# Patient Record
Sex: FEMALE | Race: WHITE | NOT HISPANIC OR LATINO | Employment: FULL TIME | URBAN - NONMETROPOLITAN AREA
[De-identification: names, ages, dates, MRNs, and addresses within clinical notes are randomized per-mention and may not be internally consistent; named-entity substitution may affect disease eponyms.]

---

## 2022-01-10 ENCOUNTER — OFFICE VISIT (OUTPATIENT)
Dept: URGENT CARE | Facility: CLINIC | Age: 62
End: 2022-01-10
Payer: COMMERCIAL

## 2022-01-10 VITALS
HEIGHT: 65 IN | BODY MASS INDEX: 38.49 KG/M2 | TEMPERATURE: 97.5 F | WEIGHT: 231 LBS | SYSTOLIC BLOOD PRESSURE: 180 MMHG | DIASTOLIC BLOOD PRESSURE: 88 MMHG | OXYGEN SATURATION: 98 % | HEART RATE: 71 BPM

## 2022-01-10 DIAGNOSIS — J06.9 ACUTE URI: Primary | ICD-10-CM

## 2022-01-10 PROCEDURE — G0382 LEV 3 HOSP TYPE B ED VISIT: HCPCS

## 2022-01-10 PROCEDURE — U0003 INFECTIOUS AGENT DETECTION BY NUCLEIC ACID (DNA OR RNA); SEVERE ACUTE RESPIRATORY SYNDROME CORONAVIRUS 2 (SARS-COV-2) (CORONAVIRUS DISEASE [COVID-19]), AMPLIFIED PROBE TECHNIQUE, MAKING USE OF HIGH THROUGHPUT TECHNOLOGIES AS DESCRIBED BY CMS-2020-01-R: HCPCS

## 2022-01-10 PROCEDURE — U0005 INFEC AGEN DETEC AMPLI PROBE: HCPCS

## 2022-01-10 NOTE — PROGRESS NOTES
[facility]        NAME: Rainelle Ahumada is a 64 y o  female  : 1960    MRN: [id number]  DATE: January 10, 2022  TIME: 9:19 AM    Assessment and Plan   Acute URI [J06 9]  1  Acute URI  COVID Only -Office Collect         Patient Instructions   Magnesium 400mg by mouth daily  Plenty of fluids  Rest  Over the counter migraine medications as needed  You are currently being tested for COVID-19  The test results take approximately 48-72 hours to return  Please quarantine until these test results are back  The results are available immediately via [address]  I will call you with any positive results and if the results are unseen  Try to self isolate in the home, may wear a mask at home  The most accurate result is 24-48 hours after the onset of symptoms  If the test result is negative and you have tested prior to this time , it may be too early and retesting may need to occur if your symptoms persist     Clean high touch surfaces after use including the bathroom  Practice proper cough etiquette and good hand hygiene  Dispose of used tissues immediately  May use an over-the-counter saline nasal spray, Mucinex as directed on the bottles for congestion as needed  May use hot showers to steam up bathroom and enter into the steaming room to help with congestion  May use Vicks in humidifier, or vapor rub, or drops in shower or tub to help with congestion  May use a cool mist or warm mist humidifier to help thin out secretions  May continue taking over-the-counter Tylenol and ibuprofen as directed on the bottle as needed for fever body aches  Ensure you are drinking plenty of fluids  Honey is a natural cough suppressant and will help soothe the sore throat  Honey should not be given to pregnant women or children under the age of 3years old  May also use over-the-counter cough drops or Cepacol as needed for sore throat  Follow with your PCP in 3-5 days    If symptoms worsen proceed to the ED  Chief Complaint     Chief Complaint   Patient presents with    Headache    Earache    Chills         History of Present Illness       Patient is a 20-year-old female who presents to the office today complaining of fatigue and headache for 2 days  She also complains of left ear pain  She states that she has a history of Meniere's disease and is always dizzy  She does have a loss of hearing in the right ear which is from her Meniere's disease  She states that she lost her hearing many years ago after having tubes and steroid infusions into that right ear  She is vaccinated for COVID as well as boosted it on December 20th  She denies any known positive exposures but states that 1 other person at work is out under investigation for COVID as well  She also complains of postnasal drip congestion  For her headache she rates it about a 6/10  She states that she took over-the-counter migraine medication for it which helped but the headache returned immediately after the medication wore off  Review of Systems   Review of Systems   Constitutional: Positive for chills and fatigue  Negative for fever  HENT: Positive for congestion, ear pain, hearing loss (Chronic in right ear) and postnasal drip  Negative for ear discharge, rhinorrhea, sinus pressure, sinus pain, sneezing and sore throat  Respiratory: Positive for cough  Negative for shortness of breath and wheezing  Cardiovascular: Negative for chest pain and palpitations  Gastrointestinal: Positive for nausea  Negative for diarrhea and vomiting  Neurological: Positive for dizziness and headaches  All other systems reviewed and are negative          Current Medications       Current Outpatient Medications:     Cholestyramine Light (PREVALITE PO), Take by mouth, Disp: , Rfl:     Current Allergies     Allergies as of 01/10/2022    (No Known Allergies)            The following portions of the patient's history were reviewed and updated as appropriate: allergies, current medications, past family history, past medical history, past social history, past surgical history and problem list      Past Medical History:   Diagnosis Date    Hypertension        Past Surgical History:   Procedure Laterality Date    SHOULDER SURGERY         Family History   Problem Relation Age of Onset    Heart disease Mother     Stroke Mother     Heart disease Father     Parkinsonism Father          Medications have been verified  Objective   BP (!) 180/88   Pulse 71   Temp 97 5 °F (36 4 °C)   Ht 5' 5" (1 651 m)   Wt 105 kg (231 lb)   SpO2 98%   BMI 38 44 kg/m²        Physical Exam     Physical Exam  Vitals and nursing note reviewed  Constitutional:       General: She is not in acute distress  Appearance: Normal appearance  She is normal weight  She is not ill-appearing or toxic-appearing  HENT:      Right Ear: There is impacted cerumen  Left Ear: Tympanic membrane normal       Nose: No congestion  Mouth/Throat:      Mouth: Mucous membranes are moist    Cardiovascular:      Rate and Rhythm: Normal rate and regular rhythm  Pulses: Normal pulses  Heart sounds: Normal heart sounds  No murmur heard  No gallop  Pulmonary:      Effort: Pulmonary effort is normal       Breath sounds: Normal breath sounds  No wheezing, rhonchi or rales  Lymphadenopathy:      Cervical: No cervical adenopathy  Skin:     General: Skin is warm and dry  Neurological:      General: No focal deficit present  Mental Status: She is alert and oriented to person, place, and time  Motor: No weakness  Psychiatric:         Mood and Affect: Mood normal          Behavior: Behavior normal          Thought Content:  Thought content normal          Judgment: Judgment normal

## 2022-01-10 NOTE — PATIENT INSTRUCTIONS
Magnesium 400mg by mouth daily  Plenty of fluids  Rest  Over the counter migraine medications as needed  You are currently being tested for COVID-19  The test results take approximately 48-72 hours to return  Please quarantine until these test results are back  The results are available immediately via [address]  I will call you with any positive results and if the results are unseen  Try to self isolate in the home, may wear a mask at home  The most accurate result is 24-48 hours after the onset of symptoms  If the test result is negative and you have tested prior to this time , it may be too early and retesting may need to occur if your symptoms persist     Clean high touch surfaces after use including the bathroom  Practice proper cough etiquette and good hand hygiene  Dispose of used tissues immediately  May use an over-the-counter saline nasal spray, Mucinex as directed on the bottles for congestion as needed  May use hot showers to steam up bathroom and enter into the steaming room to help with congestion  May use Vicks in humidifier, or vapor rub, or drops in shower or tub to help with congestion  May use a cool mist or warm mist humidifier to help thin out secretions  May continue taking over-the-counter Tylenol and ibuprofen as directed on the bottle as needed for fever body aches  Ensure you are drinking plenty of fluids  Honey is a natural cough suppressant and will help soothe the sore throat  Honey should not be given to pregnant women or children under the age of 3years old  May also use over-the-counter cough drops or Cepacol as needed for sore throat  Follow with your PCP in 3-5 days  If symptoms worsen proceed to the ED  Acute Headache   AMBULATORY CARE:   An acute headache  is pain or discomfort that may start suddenly and get worse quickly  You may have an acute headache only when you feel stress or eat certain foods   Other acute headache pain can happen every day, and sometimes several times a day  The cause of an acute headache may not be known  It may be triggered by stress, fatigue, hormones, food, or trauma  Common types of acute headache:   · Tension headache  is the most common type of headache  These headaches typically occur in the late afternoon and go away by evening  The pain is usually mild or moderate  You may have problems tolerating bright light or loud noise  The pain is usually across the forehead or in the back of the head, often only on one side  These headaches may occur every day  · Migraine headaches  cause moderate or severe pain  The headache generally lasts from 1 to 3 days and tends to come back  Pain is usually on only one side, but it may change sides  Migraines often occur in the temple, the back of the head, or behind the eye  The pain may throb or be sharp and steady  · A migraine with aura  means you see or feel something before a migraine  You may see a small spot surrounded by bright zigzag lines  Other signs or symptoms may follow the aura  · Cluster headache  pain is usually only on one side  It often causes severe pain, and can last for 30 minutes to 2 hours  These headaches may occur 1 or 2 times each day, more often at night  The pain may wake you  Seek care immediately if:   · You have severe pain  · You have numbness or weakness on one side of your face or body  · You have a headache that occurs after a blow to the head, a fall, or other trauma  · You have a headache, are forgetful or confused, or have trouble speaking  · You have a headache, stiff neck, and a fever  Call your doctor if:   · You have a constant headache and are vomiting  · You have a headache each day that does not get better, even after treatment  · You have changes in your headaches, or new symptoms that occur when you have a headache  · You have questions or concerns about your condition or care      Treatment:   · Medicine may be given to decrease pain  The medicine your healthcare provider recommends will depend on the kind of headaches you have  You will need to take prescription headache medicines as directed to prevent a problem called rebound headache  These headaches happen with regular use of pain relievers for headache disorders  NSAIDs or acetaminophen may help some kinds of headaches  · Biofeedback  may help you learn how to change stress reactions  For example, you learn to slow your heart rate when you become upset  You may also learn to prevent certain headaches by combining heat with relaxation  · Cognitive behavior therapy,  or stress management, may be used with other therapies to prevent headaches  Manage your symptoms:   · Apply heat or ice  on the headache area  Use a heat or ice pack  For an ice pack, you can also put crushed ice in a plastic bag  Cover the pack or bag with a towel before you apply it to your skin  Ice and heat both help decrease pain, and heat helps decrease muscle spasms  Apply heat for 20 to 30 minutes every 2 hours  Apply ice for 15 to 20 minutes every hour  Apply heat or ice for as long and for as many days as directed  You may alternate heat and ice  · Relax your muscles  Lie down in a comfortable position and close your eyes  Relax your muscles slowly  Start at your toes and work your way up your body  · Keep a record of your headaches  Write down when your headaches start and stop  Include your symptoms and what you were doing when the headache began  Record what you ate or drank for 24 hours before the headache started  Describe the pain and where it hurts  Keep track of what you did to treat your headache and if it worked  Prevent an acute headache:   · Avoid anything that triggers an acute headache  Examples include exposure to chemicals, going to high altitude, or not getting enough sleep  Create a regular sleep routine   Go to sleep at the same time and wake up at the same time each day  Do not use electronic devices before bedtime  These may trigger a headache or prevent you from sleeping well  · Do not smoke  Nicotine and other chemicals in cigarettes and cigars can trigger an acute headache or make it worse  Ask your healthcare provider for information if you currently smoke and need help to quit  E-cigarettes or smokeless tobacco still contain nicotine  Talk to your healthcare provider before you use these products  · Limit alcohol as directed  Alcohol can trigger an acute headache or make it worse  If you have cluster headaches, do not drink alcohol during an episode  For other types of headaches, ask your healthcare provider if it is safe for you to drink alcohol  Ask how much is safe for you to drink, and how often  · Exercise as directed  Exercise can reduce tension and help with headache pain  Aim for 30 minutes of physical activity on most days of the week  Your healthcare provider can help you create an exercise plan  · Eat a variety of healthy foods  Healthy foods include fruits, vegetables, low-fat dairy products, lean meats, fish, whole grains, and cooked beans  Your healthcare provider or dietitian can help you create meals plans if you need to avoid foods that trigger headaches  Follow up with your healthcare provider as directed:  Bring your headache record with you when you see your healthcare provider  Write down your questions so you remember to ask them during your visits  © Copyright [employer] 2021 Information is for End User's use only and may not be sold, redistributed or otherwise used for commercial purposes  All illustrations and images included in CareNotes® are the copyrighted property of [employer]  or [employer] Solomon   The above information is an  only  It is not intended as medical advice for individual conditions or treatments   Talk to your doctor, nurse or pharmacist before following any medical regimen to see if it is safe and effective for you

## 2022-01-10 NOTE — LETTER
[address] [id number]  Dept: [phone]    January 10, 2022    Patient: Ana Maria West  YOB: 1960    Ana Maria West was seen and evaluated at our [facility]  Please note if Covid and Flu tests are negative, they may return to work when fever free for 24 hours without the use of a fever reducing agent  If Covid or Flu test is positive, they may return to work on 1/15/2022, as this is 5 days from the onset of symptoms  Upon return, they must then adhere to strict masking for an additional 5 days      Sincerely,    [facility]

## 2022-01-11 LAB — SARS-COV-2 RNA RESP QL NAA+PROBE: NEGATIVE

## 2024-01-05 ENCOUNTER — APPOINTMENT (OUTPATIENT)
Dept: RADIOLOGY | Facility: CLINIC | Age: 64
End: 2024-01-05
Payer: OTHER MISCELLANEOUS

## 2024-01-05 ENCOUNTER — APPOINTMENT (OUTPATIENT)
Dept: URGENT CARE | Facility: CLINIC | Age: 64
End: 2024-01-05
Payer: OTHER MISCELLANEOUS

## 2024-01-05 DIAGNOSIS — S70.02XA CONTUSION OF LEFT HIP, INITIAL ENCOUNTER: ICD-10-CM

## 2024-01-05 DIAGNOSIS — S50.11XA CONTUSION OF RIGHT FOREARM, INITIAL ENCOUNTER: Primary | ICD-10-CM

## 2024-01-05 DIAGNOSIS — S80.02XA CONTUSION OF LEFT KNEE, INITIAL ENCOUNTER: ICD-10-CM

## 2024-01-05 DIAGNOSIS — S50.11XA CONTUSION OF RIGHT FOREARM, INITIAL ENCOUNTER: ICD-10-CM

## 2024-01-05 PROCEDURE — 90715 TDAP VACCINE 7 YRS/> IM: CPT

## 2024-01-05 PROCEDURE — 90471 IMMUNIZATION ADMIN: CPT | Performed by: PHYSICIAN ASSISTANT

## 2024-01-05 PROCEDURE — 73090 X-RAY EXAM OF FOREARM: CPT

## 2024-01-05 PROCEDURE — 73502 X-RAY EXAM HIP UNI 2-3 VIEWS: CPT

## 2024-01-05 PROCEDURE — 99283 EMERGENCY DEPT VISIT LOW MDM: CPT | Performed by: PHYSICIAN ASSISTANT

## 2024-01-05 PROCEDURE — 73564 X-RAY EXAM KNEE 4 OR MORE: CPT

## 2024-01-05 PROCEDURE — G0382 LEV 3 HOSP TYPE B ED VISIT: HCPCS | Performed by: PHYSICIAN ASSISTANT

## 2024-01-12 ENCOUNTER — APPOINTMENT (OUTPATIENT)
Dept: URGENT CARE | Facility: CLINIC | Age: 64
End: 2024-01-12
Payer: OTHER MISCELLANEOUS

## 2024-01-12 PROCEDURE — 99213 OFFICE O/P EST LOW 20 MIN: CPT | Performed by: PHYSICIAN ASSISTANT

## 2024-03-19 ENCOUNTER — APPOINTMENT (OUTPATIENT)
Dept: RADIOLOGY | Facility: CLINIC | Age: 64
End: 2024-03-19
Payer: OTHER MISCELLANEOUS

## 2024-03-19 ENCOUNTER — OCCMED (OUTPATIENT)
Dept: URGENT CARE | Facility: CLINIC | Age: 64
End: 2024-03-19
Payer: OTHER MISCELLANEOUS

## 2024-03-19 DIAGNOSIS — S97.102A CRUSHING INJURY OF UNSPECIFIED LEFT TOE(S), INITIAL ENCOUNTER: ICD-10-CM

## 2024-03-19 DIAGNOSIS — S92.425A CLOSED NONDISPLACED FRACTURE OF DISTAL PHALANX OF LEFT GREAT TOE, INITIAL ENCOUNTER: Primary | ICD-10-CM

## 2024-03-19 PROCEDURE — G0382 LEV 3 HOSP TYPE B ED VISIT: HCPCS

## 2024-03-19 PROCEDURE — 73660 X-RAY EXAM OF TOE(S): CPT

## 2024-03-19 PROCEDURE — 99283 EMERGENCY DEPT VISIT LOW MDM: CPT

## 2024-03-20 ENCOUNTER — TELEPHONE (OUTPATIENT)
Age: 64
End: 2024-03-20

## 2024-03-20 NOTE — TELEPHONE ENCOUNTER
Caller: Patient    Doctor: Podiatry    Reason for call:     Soft transfer to podiatry for list of drs for W/C panel    Call back#: n/a

## 2024-03-26 ENCOUNTER — TELEPHONE (OUTPATIENT)
Age: 64
End: 2024-03-26

## 2024-03-26 NOTE — TELEPHONE ENCOUNTER
Caller: Casie    Doctor:     Reason for call: patient wishes not to be called for an appointment with podiatry. Patient found one else where    Call back#:

## 2024-12-27 ENCOUNTER — OFFICE VISIT (OUTPATIENT)
Dept: URGENT CARE | Facility: CLINIC | Age: 64
End: 2024-12-27
Payer: COMMERCIAL

## 2024-12-27 VITALS — RESPIRATION RATE: 16 BRPM | TEMPERATURE: 99.8 F | HEART RATE: 86 BPM | OXYGEN SATURATION: 96 %

## 2024-12-27 DIAGNOSIS — R68.89 FLU-LIKE SYMPTOMS: Primary | ICD-10-CM

## 2024-12-27 PROCEDURE — G0381 LEV 2 HOSP TYPE B ED VISIT: HCPCS | Performed by: PHYSICIAN ASSISTANT

## 2024-12-27 RX ORDER — DICLOFENAC SODIUM 75 MG/1
75 TABLET, DELAYED RELEASE ORAL
COMMUNITY
Start: 2024-12-18 | End: 2025-03-18

## 2024-12-27 NOTE — PATIENT INSTRUCTIONS
Take Tylenol or Motrin as needed for fever or pain  May take over the counter cold medication to control symptoms  Drink plenty of fluids  Rest  If symptoms worsen go to the ER for further evaluation

## 2024-12-27 NOTE — PROGRESS NOTES
[facility]        NAME: Casie Maynard is a 64 y.o. female  : 1960    MRN: [id number]  DATE: 2024  TIME: 11:24 AM    Assessment and Plan   Flu-like symptoms [R68.89]  1. Flu-like symptoms              Patient Instructions     Take Tylenol or Motrin as needed for fever or pain  May take over the counter cold medication to control symptoms  Drink plenty of fluids  Rest  If symptoms worsen go to the ER for further evaluation    Follow up with PCP in 3-5 days.  Proceed to  ER if symptoms worsen.    If tests have been performed at [facility], our office will contact you with results if changes need to be made to the care plan discussed with you at the visit.  You can review your full results on [facility].    Chief Complaint     Chief Complaint   Patient presents with   • Chills   • Headache   • Sore Throat   • Nasal Congestion   • Generalized Body Aches         History of Present Illness       Patient Zentz with a 4-day history of chills, fever, stuffy nose, sore throat, cough, nausea, body aches and headaches.  She did not have a flu vaccine this season.        Review of Systems   Review of Systems   Constitutional:  Positive for chills and fever.   HENT:  Positive for congestion and sore throat. Negative for rhinorrhea.    Respiratory:  Positive for cough.    Gastrointestinal:  Positive for nausea. Negative for diarrhea and vomiting.   Musculoskeletal:  Positive for myalgias.   Neurological:  Positive for headaches.         Current Medications       Current Outpatient Medications:   •  diclofenac (VOLTAREN) 75 mg EC tablet, Take 75 mg by mouth, Disp: , Rfl:   •  Cholestyramine Light (PREVALITE PO), Take by mouth, Disp: , Rfl:     Current Allergies     Allergies as of 2024   • (No Known Allergies)            The following portions of the patient's history were reviewed and updated as appropriate: allergies, current medications, past family history, past medical history,  past social history, past surgical history and problem list.     Past Medical History:   Diagnosis Date   • Hypertension        Past Surgical History:   Procedure Laterality Date   • SHOULDER SURGERY         Family History   Problem Relation Age of Onset   • Heart disease Mother    • Stroke Mother    • Heart disease Father    • Parkinsonism Father          Medications have been verified.        Objective   Pulse 86   Temp 99.8 °F (37.7 °C)   Resp 16   SpO2 96%   No LMP recorded.       Physical Exam     Physical Exam  Vitals and nursing note reviewed.   Constitutional:       Appearance: She is well-developed.   HENT:      Head: Normocephalic and atraumatic.      Right Ear: Tympanic membrane normal.      Left Ear: Tympanic membrane normal.      Mouth/Throat:      Mouth: Mucous membranes are moist.      Pharynx: Oropharynx is clear.   Eyes:      Conjunctiva/sclera: Conjunctivae normal.   Cardiovascular:      Rate and Rhythm: Normal rate and regular rhythm.      Heart sounds: Normal heart sounds.   Pulmonary:      Effort: Pulmonary effort is normal.      Breath sounds: Normal breath sounds.   Abdominal:      Palpations: Abdomen is soft.      Tenderness: There is no abdominal tenderness.   Musculoskeletal:      Cervical back: Neck supple.   Lymphadenopathy:      Cervical: No cervical adenopathy.   Skin:     General: Skin is warm.   Neurological:      Mental Status: She is alert.

## 2024-12-27 NOTE — LETTER
December 27, 2024     Patient: Casie Maynard   YOB: 1960   Date of Visit: 12/27/2024       To Whom It May Concern:    It is my medical opinion that Casie Maynard not attend work until fever resolves due to flu-like symptoms.    If you have any questions or concerns, please don't hesitate to call.         Sincerely,        Nohemy Adame PA-C    CC: No Recipients